# Patient Record
Sex: MALE | Race: WHITE | Employment: OTHER | URBAN - METROPOLITAN AREA
[De-identification: names, ages, dates, MRNs, and addresses within clinical notes are randomized per-mention and may not be internally consistent; named-entity substitution may affect disease eponyms.]

---

## 2023-03-03 ENCOUNTER — HOSPITAL ENCOUNTER (EMERGENCY)
Age: 83
Discharge: HOME | End: 2023-03-03
Attending: EMERGENCY MEDICINE
Payer: MEDICARE

## 2023-03-03 VITALS
HEIGHT: 72 IN | DIASTOLIC BLOOD PRESSURE: 85 MMHG | WEIGHT: 220 LBS | SYSTOLIC BLOOD PRESSURE: 101 MMHG | RESPIRATION RATE: 16 BRPM | HEART RATE: 70 BPM | OXYGEN SATURATION: 97 % | BODY MASS INDEX: 29.8 KG/M2 | TEMPERATURE: 97.7 F

## 2023-03-03 DIAGNOSIS — H11.152 PINGUECULA OF LEFT EYE: ICD-10-CM

## 2023-03-03 DIAGNOSIS — H11.32 SUBCONJUNCTIVAL HEMORRHAGE OF LEFT EYE: Primary | ICD-10-CM

## 2023-03-03 DIAGNOSIS — S05.02XA ABRASION OF LEFT CORNEA, INITIAL ENCOUNTER: ICD-10-CM

## 2023-03-03 PROCEDURE — 99283 EMERGENCY DEPT VISIT LOW MDM: CPT

## 2023-03-03 RX ORDER — TETRACAINE HYDROCHLORIDE 5 MG/ML
1 SOLUTION OPHTHALMIC ONCE
Status: DISCONTINUED | OUTPATIENT
Start: 2023-03-03 | End: 2023-03-03 | Stop reason: HOSPADM

## 2023-03-03 RX ORDER — OFLOXACIN 3 MG/ML
2 SOLUTION/ DROPS OPHTHALMIC 4 TIMES DAILY
Qty: 5 ML | Refills: 0 | Status: SHIPPED | OUTPATIENT
Start: 2023-03-03 | End: 2023-03-10

## 2023-03-03 ASSESSMENT — PAIN DESCRIPTION - DESCRIPTORS: DESCRIPTORS: ACHING

## 2023-03-03 ASSESSMENT — PAIN DESCRIPTION - LOCATION: LOCATION: EYE

## 2023-03-03 ASSESSMENT — LIFESTYLE VARIABLES
HOW MANY STANDARD DRINKS CONTAINING ALCOHOL DO YOU HAVE ON A TYPICAL DAY: PATIENT DOES NOT DRINK
HOW OFTEN DO YOU HAVE A DRINK CONTAINING ALCOHOL: NEVER

## 2023-03-03 ASSESSMENT — PAIN DESCRIPTION - ORIENTATION: ORIENTATION: LEFT

## 2023-03-03 ASSESSMENT — PAIN SCALES - GENERAL: PAINLEVEL_OUTOF10: 3

## 2023-03-03 ASSESSMENT — PAIN - FUNCTIONAL ASSESSMENT: PAIN_FUNCTIONAL_ASSESSMENT: 0-10

## 2023-03-03 NOTE — DISCHARGE INSTRUCTIONS
Please follow-up with your ophthalmologist first thing next week, monitor closely for signs of increasing eye pain, sensitivity light, nausea/vomiting, changes in vision. If you have the symptoms, particularly any type of change in vision, please present to the ER for repeat evaluation.

## 2023-03-03 NOTE — ED NOTES
I have reviewed discharge instructions with the patient. The patient and spouse verbalized understanding. Patient left ED via Discharge Method: ambulatory to Home with spouse. Opportunity for questions and clarification provided. Patient given 1 scripts. To continue your aftercare when you leave the hospital, you may receive an automated call from our care team to check in on how you are doing. This is a free service and part of our promise to provide the best care and service to meet your aftercare needs.  If you have questions, or wish to unsubscribe from this service please call [phone]. Thank you for Choosing our [facility] Emergency Department.       Sandy Berg RN  03/03/23 5575

## 2023-03-03 NOTE — ED PROVIDER NOTES
Emergency Department Provider Note                   PCP:                Dimitris Lal MD               Age: 78 y.o. Sex: male     Final diagnosis/impression:  No diagnosis found. Disposition: []     MDM/Clinical Course:  Patient seen by myself at the [] emergency department. History was collected from []. In consideration of patient problem this represents []. Patient had signs symptoms and clinical history most consistent with []. Factors complicating medical decision making or management more complex include []. Work-up considered but not performed includes []. My independent analysis/interpretation of laboratory work-up here shows []. Radiology shows []. While under my care, patient received []. Complexity of Problems Addressed:  {Complexity:55581}    Data Reviewed and Analyzed:  Category 1:   {external source:39869}  I ordered each unique test.  I reviewed the results of each unique test.    {Historian (state who, why needed, what they said):80223}    Category 2:     ED EKG was independently interpreted in the absence of a cardiologist.  Rate: ***  EKG Interpretation: {EKG Interpretation:84649:::1}  ST Segments: {ST Segments:97415}  {test reviewed:16293}    Radiology:  My independent chest x-ray review shows no pneumothorax, no pneumonia, no cardiomegaly, no aortic widening, no noted focal consolidation. []    Category 3: Discussion of management or test interpretation. See MDM / clinical course section above for details    Risk of Complications and/or Morbidity of Patient Management:  {EKRP:14300}   Shared medical decision making performed with patient/family during course of ER visit and a determination of disposition whenever appropriate.       Critical Care:  Please see procedure note, []    Critical illness or injury acutely impairs one or more vital organ  systems such that there is a high probability of imminent or  life threatening deterioration in the patients condition:    Critical care involves high complexity decision making to assess, manipulate, and support vital system function(s) to treat single or multiple vital organ system failure and/or to prevent further life threatening deterioration of the patients condition:v    Sepsis:  Is this patient to be included in the SEP-1 core measure due to severe sepsis or septic shock? {Sep-1 Core Yes/No:223871}     No orders of the defined types were placed in this encounter. ED Meds Given:  Medications   tetracaine (TETRAVISC) 0.5 % ophthalmic solution 1 drop (has no administration in time range)   fluorescein ophthalmic strip 1 mg (has no administration in time range)     New Prescriptions    No medications on file         ___________________    HPI: Rose Warren is a 78 y.o. male with []. []. Patient/family denies any other evaluation for today's acute complaint. Patient/family denies any other aggravating or alleviating factors. Patient/family denies any other symptoms. 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 Past Medical/ Family/ Social History:     Medical history: No past medical history on file. Surgical history: No past surgical history on file. Family history: No family history on file. Social history:   Social History     Socioeconomic History    Marital status:         Medications:   Previous Medications    No medications on file        Allergies: No Known Allergies    Physical Exam   Vitals signs reviewed. Patient Vitals for the past 4 hrs:   Temp Pulse Resp BP SpO2   03/03/23 1541 97.7 °F (36.5 °C) 70 16 101/85 97 %       General: Alert and oriented ×4, no acute distress   Eyes: Anicteric, conjunctiva pink, PERRLA, EOMI              ENT: No nasal discharge, no gross nasal congestion present  Pulmonary: Clear to auscultation bilaterally with symmetric chest rise, no increased work of breathing, no accessory muscle use  Cardiovascular: Regular rate and rhythm, no rub or gallop appreciated on my exam  GI: Abdomen is soft, nontender, nondistended  Musculoskeletal: No obvious joint deformity or joint effusion, normal joint range of motion  Neuro: Cranial nerves II through VII grossly intact, strength and sensation is grossly intact in the upper and lower extremities bilaterally  Skin: Skin is warm and dry          Procedures    No results found for any visits on 03/03/23. No orders to display                   Voice dictation software was used during the making of this note. This software is not perfect and grammatical and other typographical errors may be present. This note has not been completely proofread for errors. chest rise, no increased work of breathing, no accessory muscle use  Cardiovascular: Regular rate and rhythm, no rub or gallop appreciated on my exam  GI: Abdomen is soft, nontender, nondistended  Musculoskeletal: No obvious joint deformity or joint effusion, normal joint range of motion  Neuro: Cranial nerves II through VII grossly intact, strength and sensation is grossly intact in the upper and lower extremities bilaterally  Skin: Skin is warm and dry          Procedures    No results found for any visits on 03/03/23. No orders to display                   Voice dictation software was used during the making of this note. This software is not perfect and grammatical and other typographical errors may be present. This note has not been completely proofread for errors.      Florecita Vega MD  03/07/23 4336